# Patient Record
Sex: FEMALE | Race: WHITE
[De-identification: names, ages, dates, MRNs, and addresses within clinical notes are randomized per-mention and may not be internally consistent; named-entity substitution may affect disease eponyms.]

---

## 2020-06-19 ENCOUNTER — HOSPITAL ENCOUNTER (EMERGENCY)
Dept: HOSPITAL 62 - ER | Age: 20
LOS: 1 days | Discharge: HOME | End: 2020-06-20
Payer: COMMERCIAL

## 2020-06-19 DIAGNOSIS — R30.0: ICD-10-CM

## 2020-06-19 DIAGNOSIS — R11.0: ICD-10-CM

## 2020-06-19 DIAGNOSIS — M54.5: Primary | ICD-10-CM

## 2020-06-19 DIAGNOSIS — R10.30: ICD-10-CM

## 2020-06-19 PROCEDURE — 72131 CT LUMBAR SPINE W/O DYE: CPT

## 2020-06-19 PROCEDURE — 72128 CT CHEST SPINE W/O DYE: CPT

## 2020-06-19 PROCEDURE — 87210 SMEAR WET MOUNT SALINE/INK: CPT

## 2020-06-19 PROCEDURE — 81025 URINE PREGNANCY TEST: CPT

## 2020-06-19 PROCEDURE — 81001 URINALYSIS AUTO W/SCOPE: CPT

## 2020-06-19 PROCEDURE — 99284 EMERGENCY DEPT VISIT MOD MDM: CPT

## 2020-06-19 PROCEDURE — 96372 THER/PROPH/DIAG INJ SC/IM: CPT

## 2020-06-19 PROCEDURE — 87591 N.GONORRHOEAE DNA AMP PROB: CPT

## 2020-06-19 PROCEDURE — 87491 CHLMYD TRACH DNA AMP PROBE: CPT

## 2020-06-19 NOTE — RADIOLOGY REPORT (SQ)
CT LUMBAR SPINE WITHOUT IV CONTRAST



HISTORY: Back pain, previous fall.



COMPARISON: None.



TECHNIQUE: CT scan of the lumbar spine was performed without  IV

contrast. This exam was performed according to our departmental

dose-optimization program, which includes automated exposure

control, adjustment of the mA and/or kV according to patient size

and/or use of iterative reconstruction technique.



FINDINGS: 



No acute compression fracture is seen. The lumbar alignment is

maintained. The disc spaces are preserved. No advanced canal

stenosis is identified. The sacroiliac joints are intact.



IMPRESSION: 



No acute fracture or subluxation of the lumbar spine.

## 2020-06-19 NOTE — ER DOCUMENT REPORT
ED Medical Screen (RME)





- General


Chief Complaint: Back Pain


Stated Complaint: LOWER BACK PAIN


Time Seen by Provider: 06/19/20 21:40


Primary Care Provider: 


AGATA DENT MD [Primary Care Provider] - Follow up as needed


Mode of Arrival: Ambulatory


Information source: Patient


Notes: 





19-year-old female patient presenting to the emergency department chief com

plaint of middle and lower back pain.  Patient reports pain ongoing for the last

6 months however has significantly worsened over the last few days.  Patient 

denies any bowel or bladder incontinence, denies any urinary retention.  She 

states she was seen at her primary care provider and she is scheduled for an 

MRI.  She states 6 months ago she fell off of a horse and was seen in another 

emergency department and had x-rays taken which were negative.  Patient reports 

she is now having pins-and-needles sensation in her low back and down both of 

her legs.





She is neurologically intact in triage although exam is limited due to seated 

position.





I have greeted and performed a rapid initial assessment of this patient.  A 

comprehensive ED assessment and evaluation of the patient, analysis of test 

results and completion of the medical decision making process will be conducted 

by additional ED providers.  I have specifically instructed the patient or 

family members with the patient to immediately return to any nursing staff 

should anything change in the patient's condition or with their chief complaint.








- Related Data


Allergies/Adverse Reactions: 


                                        





No Known Allergies Allergy (Unverified 06/19/20 21:39)


   








Home Medications: lexapro 20mg





Past Medical History





- Social History


Chew tobacco use (# tins/day): No


Frequency of alcohol use: None


Drug Abuse: None





Physical Exam





- Vital signs


Vitals: 





                                        











Temp Pulse Resp BP Pulse Ox


 


 98.8 F   113 H  16   139/72 H  96 


 


 06/19/20 21:17  06/19/20 21:17  06/19/20 21:17  06/19/20 21:17  06/19/20 21:17














Course





- Vital Signs


Vital signs: 





                                        











Temp Pulse Resp BP Pulse Ox


 


 98.8 F   113 H  16   139/72 H  96 


 


 06/19/20 21:40  06/19/20 21:17  06/19/20 21:17  06/19/20 21:17  06/19/20 21:17














Doctor's Discharge





- Discharge


Referrals: 


AGATA DENT MD [Primary Care Provider] - Follow up as needed

## 2020-06-19 NOTE — RADIOLOGY REPORT (SQ)
EXAM DESCRIPTION:

 Unenhanced CT scan of the thoracic spine



CLINICAL HISTORY: 

19 years  Female;  back pain, previous fall



TECHNIQUE: 

Noncontrast thoracic spine CT with sagittal and coronal

reconstructions.

All CT scans at this facility use dose modulation, iterative

reconstruction, and/or weight based dosing when appropriate to

reduce radiation dose to as low as reasonably achievable.



COMPARISON: None.



FINDINGS: 

Alignment of the spine is anatomic. Vertebral body heights and

disc spaces are preserved. No fracture is seen. No foraminal

narrowing or spinal stenosis. Visualized posterior ribs are

intact. Lungs are clear. No pneumothorax. No pleural effusions.

No focal consolidation. Visualized portion of the mediastinum and

retroperitoneum are unremarkable.



IMPRESSION:

Unremarkable CT scan of the thoracic spine. No acute process.

## 2020-06-20 VITALS — SYSTOLIC BLOOD PRESSURE: 117 MMHG | DIASTOLIC BLOOD PRESSURE: 53 MMHG

## 2020-06-20 LAB
APPEARANCE UR: (no result)
APTT PPP: YELLOW S
BILIRUB UR QL STRIP: NEGATIVE
CHLAM PCR: NOT DETECTED
GLUCOSE UR STRIP-MCNC: NEGATIVE MG/DL
KETONES UR STRIP-MCNC: NEGATIVE MG/DL
NITRITE UR QL STRIP: NEGATIVE
PH UR STRIP: 5 [PH] (ref 5–9)
PROT UR STRIP-MCNC: NEGATIVE MG/DL
SP GR UR STRIP: 1.02
T.VAGINALIS (WET MOUNT): (no result)
UROBILINOGEN UR-MCNC: NEGATIVE MG/DL (ref ?–2)
WBCS (WET MOUNT): (no result)
YEAST (WET MOUNT): (no result)

## 2020-06-20 NOTE — ER DOCUMENT REPORT
ED General





- General


Chief Complaint: Back Pain


Stated Complaint: LOWER BACK PAIN


Time Seen by Provider: 06/19/20 21:40


Primary Care Provider: 


AGATA DENT MD [Primary Care Provider] - Follow up as needed


Mode of Arrival: Ambulatory


Notes: 


Patient is a 19-year-old female that comes emergency department for chief 

complaint of lower back pain on both sides.  She states that she has had pains 

intermittently for about 5 months, ever since she fell off a horse, she states 

she was evaluated at that time and had a CT of the head and imaging of her neck 

but not of her lower back.  She states she follow-up with primary care and they 

schedule her for an MRI at the beginning of next month, however the pain 

worsened over the past 2 to 3 days so she came in.  She reports occasional na

usea.  She does admit to some pain in her lower abdomen with urination, vaginal 

discharge which is new.  She denies vaginal bleeding, new injury, fever/chills, 

vomiting.  She denies any surgeries except orthopedic.  She denies any daily 

medications other than Mobic and prednisone which she was prescribed but she 

states she stopped taking them both because she did not like how they made her 

feel.  She denies recreational drugs, specifically IV drug abuse.  She denies 

incontinence, numbness, inability to urinate.











- Related Data


Allergies/Adverse Reactions: 


                                        





No Known Allergies Allergy (Unverified 06/19/20 21:39)


   








Home Medications: lexapro 20mg





Past Medical History





- General


Information source: Patient





- Social History


Smoking Status: Never Smoker


Chew tobacco use (# tins/day): No


Frequency of alcohol use: None


Drug Abuse: None


Lives with: Family


Family History: Reviewed & Not Pertinent


Patient has homicidal ideation: No


Surgical Hx: Negative





- Immunizations


Hx Diphtheria, Pertussis, Tetanus Vaccination: Yes





Review of Systems





- Review of Systems


Constitutional: No symptoms reported


EENT: No symptoms reported


Cardiovascular: No symptoms reported


Respiratory: No symptoms reported


Gastrointestinal: See HPI


Genitourinary: See HPI


Female Genitourinary: See HPI


Musculoskeletal: See HPI


Skin: No symptoms reported


Hematologic/Lymphatic: No symptoms reported


Neurological/Psychological: No symptoms reported





Physical Exam





- Vital signs


Vitals: 


                                        











Temp Pulse Resp BP Pulse Ox


 


 98.8 F   113 H  16   139/72 H  96 


 


 06/19/20 21:17  06/19/20 21:17  06/19/20 21:17  06/19/20 21:17  06/19/20 21:17














- Notes


Notes: 





GENERAL: Alert, interacts well. No acute distress.


HEAD: Normocephalic, atraumatic.


EYES: Pupils equal, round, and reactive to light. Extraocular movements intact.


ENT: Oral mucosa moist, tongue midline. Oropharynx unremarkable. Airway patent. 


LUNGS: Clear to auscultation bilaterally, no wheezes, rales, or rhonchi. No 

respiratory distress. Non-tender chest wall. 


HEART: Regular rate and rhythm. No murmur


ABDOMEN: There is mild generalized tenderness over the suprapubic area and lower

abdomen generally, mid to upper abdomen benign, no guarding or rigidity, no 

distention


GENITOURINARY: No concerning external findings, no discharge on evaluation, no 

bleeding, IUD strings in place appropriately in the cervix, no concerning 

findings, no cervical motion tenderness.  Exam performed with Sheba RN at 

bedside


EXTREMITIES: Moves all 4 extremities spontaneously. No edema, normal radial and 

dorsalis pedis pulses bilaterally. No cyanosis.


BACK: Patient complains with palpation specifically over the upper lumbar 

paralumbar musculature, I do not appreciate any midline tenderness at the back, 

no signs of trauma, no saddle anesthesia, normal distal neurovascular exam. 

Moves all extremities in full range of motion.


NEUROLOGICAL: Alert and oriented x3. Normal speech. Cranial nerves II through 

XII grossly intact. Strength 5/5 in all extremities. 


PSYCH: Normal affect, normal mood.


SKIN: Warm, dry, normal turgor. No rashes or lesions noted.





Course





- Re-evaluation


Re-evalutation: 


Imaging of the back reviewed from triage and completely unremarkable.  Patient 

with paralumbar musculature tenderness on exam but her back exam is unremarkable

otherwise and very reassuring.  No IV drug abuse history, no concerning vital 

signs.  Patient has mild tenderness of the abdomen/pelvis, reported discharge 

but none seen on exam, unremarkable pelvic exam after discussion and patient 

electing to have it performed.  Urinalysis shows possible urinary tract 

infection with patient reporting dysuria.  She was treated for this, she will be

treated with muscle relaxers, she already has good primary care follow-up, 

discussed details of her findings, follow-up, and return precautions.  Patient 

states understanding and agreement.  Stable and well-appearing at time of 

discharge.





- Vital Signs


Vital signs: 


                                        











Temp Pulse Resp BP Pulse Ox


 


 97.9 F   94 H  15   117/53 L  99 


 


 06/20/20 03:20  06/20/20 03:20  06/20/20 03:20  06/20/20 03:20  06/20/20 03:20














- Laboratory


Laboratory results interpreted by me: 


                                        











  06/20/20





  02:15


 


Urine Blood  [facility]


 


Ur Leukocyte Esterase  [facility]














Discharge





- Discharge


Clinical Impression: 


 Lower abdominal pain, Dysuria





Back pain


Qualifiers:


 Back pain location: back pain in unspecified location Chronicity: unspecified 

Back pain laterality: bilateral Qualified Code(s): M54.9 - Dorsalgia, 

unspecified





Condition: Stable


Disposition: HOME, SELF-CARE


Additional Instructions: 


Your imaging of the back does not show any concerning findings.  You are being 

treated for urinary tract infection, the remaining tests do not show any 

concerning findings.  Take the antibiotics as prescribed to completion.  Your 

evaluation does indicate muscular tenderness of your back, I recommend a muscle 

x-ray as prescribed, gentle stretches and massage, heat to the area.  You can 

continue your meloxicam as well as this could help.  Follow-up with primary care

for additional management.





Return for any concerning symptoms including fever, vomiting, severe worsening 

pain, developing numbness, inability to control your bladder or bowels, or any 

other concerning or worsening symptoms.


Prescriptions: 


Cyclobenzaprine HCl 1 - 2 tab PO Q8H PRN #20 tablet


 PRN Reason: 


Cephalexin Monohydrate [Keflex 500 mg Capsule] 500 mg PO BID 5 Days #10 capsule


Forms:  Return to Work


Referrals: 


AGATA DENT MD [Primary Care Provider] - Follow up as needed

## 2020-06-27 ENCOUNTER — HOSPITAL ENCOUNTER (EMERGENCY)
Dept: HOSPITAL 62 - ER | Age: 20
LOS: 1 days | Discharge: LEFT BEFORE DISCHARGE | End: 2020-06-28
Payer: COMMERCIAL

## 2020-06-27 VITALS — DIASTOLIC BLOOD PRESSURE: 85 MMHG | SYSTOLIC BLOOD PRESSURE: 122 MMHG

## 2020-06-27 DIAGNOSIS — Z53.20: ICD-10-CM

## 2020-06-27 DIAGNOSIS — M54.9: ICD-10-CM

## 2020-06-27 DIAGNOSIS — R55: Primary | ICD-10-CM

## 2020-06-27 LAB
ADD MANUAL DIFF: NO
ALBUMIN SERPL-MCNC: 4.6 G/DL (ref 3.7–5.6)
ALP SERPL-CCNC: 78 U/L (ref 50–135)
ANION GAP SERPL CALC-SCNC: 7 MMOL/L (ref 5–19)
APTT BLD: 32.2 SEC (ref 23.5–35.8)
AST SERPL-CCNC: 30 U/L (ref 5–30)
BASOPHILS # BLD AUTO: 0.1 10^3/UL (ref 0–0.2)
BASOPHILS NFR BLD AUTO: 0.5 % (ref 0–2)
BILIRUB DIRECT SERPL-MCNC: 0 MG/DL (ref 0–0.4)
BILIRUB SERPL-MCNC: 0.9 MG/DL (ref 0.2–1.3)
BUN SERPL-MCNC: 13 MG/DL (ref 7–20)
CALCIUM: 9.4 MG/DL (ref 8.4–10.2)
CHLORIDE SERPL-SCNC: 101 MMOL/L (ref 98–107)
CO2 SERPL-SCNC: 30 MMOL/L (ref 22–30)
EOSINOPHIL # BLD AUTO: 0.1 10^3/UL (ref 0–0.6)
EOSINOPHIL NFR BLD AUTO: 1.1 % (ref 0–6)
ERYTHROCYTE [DISTWIDTH] IN BLOOD BY AUTOMATED COUNT: 13.1 % (ref 11.5–14)
GLUCOSE SERPL-MCNC: 97 MG/DL (ref 75–110)
HCT VFR BLD CALC: 39 % (ref 36–47)
HGB BLD-MCNC: 12.9 G/DL (ref 12–15.5)
INR PPP: 0.92
LYMPHOCYTES # BLD AUTO: 3 10^3/UL (ref 0.5–4.7)
LYMPHOCYTES NFR BLD AUTO: 27.3 % (ref 13–45)
MCH RBC QN AUTO: 27.4 PG (ref 27–33.4)
MCHC RBC AUTO-ENTMCNC: 33.1 G/DL (ref 32–36)
MCV RBC AUTO: 83 FL (ref 80–97)
MONOCYTES # BLD AUTO: 0.7 10^3/UL (ref 0.1–1.4)
MONOCYTES NFR BLD AUTO: 6.6 % (ref 3–13)
NEUTROPHILS # BLD AUTO: 7 10^3/UL (ref 1.7–8.2)
NEUTS SEG NFR BLD AUTO: 64.5 % (ref 42–78)
PLATELET # BLD: 382 10^3/UL (ref 150–450)
POTASSIUM SERPL-SCNC: 4.7 MMOL/L (ref 3.6–5)
PROT SERPL-MCNC: 7.8 G/DL (ref 6.3–8.2)
PROTHROMBIN TIME: 12.4 SEC (ref 11.4–15.4)
RBC # BLD AUTO: 4.7 10^6/UL (ref 3.72–5.28)
TOTAL CELLS COUNTED % (AUTO): 100 %
WBC # BLD AUTO: 10.8 10^3/UL (ref 4–10.5)

## 2020-06-27 PROCEDURE — 85730 THROMBOPLASTIN TIME PARTIAL: CPT

## 2020-06-27 PROCEDURE — 84703 CHORIONIC GONADOTROPIN ASSAY: CPT

## 2020-06-27 PROCEDURE — 99281 EMR DPT VST MAYX REQ PHY/QHP: CPT

## 2020-06-27 PROCEDURE — 85610 PROTHROMBIN TIME: CPT

## 2020-06-27 PROCEDURE — 93010 ELECTROCARDIOGRAM REPORT: CPT

## 2020-06-27 PROCEDURE — 85025 COMPLETE CBC W/AUTO DIFF WBC: CPT

## 2020-06-27 PROCEDURE — 93005 ELECTROCARDIOGRAM TRACING: CPT

## 2020-06-27 PROCEDURE — 80053 COMPREHEN METABOLIC PANEL: CPT

## 2020-06-27 PROCEDURE — 81001 URINALYSIS AUTO W/SCOPE: CPT

## 2020-06-27 PROCEDURE — 36415 COLL VENOUS BLD VENIPUNCTURE: CPT
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
## 2020-06-27 NOTE — EKG REPORT
SEVERITY:- BORDERLINE ECG -

SINUS TACHYCARDIA

INFERIOR Q WAVES, PROBABLY NORMAL VARIATION

:

Confirmed by: True Jansen 27-Jun-2020 22:04:23

## 2020-06-28 LAB
APPEARANCE UR: (no result)
APTT PPP: YELLOW S
BILIRUB UR QL STRIP: NEGATIVE
GLUCOSE UR STRIP-MCNC: NEGATIVE MG/DL
KETONES UR STRIP-MCNC: NEGATIVE MG/DL
NITRITE UR QL STRIP: NEGATIVE
PH UR STRIP: 5 [PH] (ref 5–9)
PROT UR STRIP-MCNC: NEGATIVE MG/DL
SP GR UR STRIP: 1.02
UROBILINOGEN UR-MCNC: 2 MG/DL (ref ?–2)